# Patient Record
Sex: FEMALE | Race: ASIAN
[De-identification: names, ages, dates, MRNs, and addresses within clinical notes are randomized per-mention and may not be internally consistent; named-entity substitution may affect disease eponyms.]

---

## 2020-04-21 ENCOUNTER — HOSPITAL ENCOUNTER (EMERGENCY)
Dept: HOSPITAL 54 - ER | Age: 45
Discharge: HOME | End: 2020-04-21
Payer: COMMERCIAL

## 2020-04-21 VITALS — DIASTOLIC BLOOD PRESSURE: 70 MMHG | SYSTOLIC BLOOD PRESSURE: 119 MMHG

## 2020-04-21 VITALS — BODY MASS INDEX: 24.25 KG/M2 | HEIGHT: 68 IN | WEIGHT: 160 LBS
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
## 2020-04-21 DIAGNOSIS — S61.411A: Primary | ICD-10-CM

## 2020-04-21 DIAGNOSIS — Z60.2: ICD-10-CM

## 2020-04-21 DIAGNOSIS — M54.2: ICD-10-CM
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
## 2020-04-21 DIAGNOSIS — S20.211A: ICD-10-CM
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
## 2020-04-21 DIAGNOSIS — S00.03XA: ICD-10-CM

## 2020-04-21 PROCEDURE — 72125 CT NECK SPINE W/O DYE: CPT

## 2020-04-21 PROCEDURE — 70450 CT HEAD/BRAIN W/O DYE: CPT

## 2020-04-21 PROCEDURE — 71100 X-RAY EXAM RIBS UNI 2 VIEWS: CPT

## 2020-04-21 PROCEDURE — 96372 THER/PROPH/DIAG INJ SC/IM: CPT

## 2020-04-21 PROCEDURE — 12002 RPR S/N/AX/GEN/TRNK2.6-7.5CM: CPT

## 2020-04-21 PROCEDURE — 73000 X-RAY EXAM OF COLLAR BONE: CPT

## 2020-04-21 PROCEDURE — 99285 EMERGENCY DEPT VISIT HI MDM: CPT

## 2020-04-21 PROCEDURE — L0172 CERV COL SR FOAM 2PC PRE OTS: HCPCS

## 2020-04-21 PROCEDURE — 73130 X-RAY EXAM OF HAND: CPT

## 2020-04-21 PROCEDURE — A6403 STERILE GAUZE>16 <= 48 SQ IN: HCPCS

## 2020-04-21 SDOH — SOCIAL STABILITY - SOCIAL INSECURITY: PROBLEMS RELATED TO LIVING ALONE: Z60.2

## 2020-04-21 NOTE — NUR
Ice compress to right hand.Patient discharged to home in stable condition. 
Written and verbal after care instructions given. Patient verbalizes 
understanding of instruction.